# Patient Record
Sex: MALE | Race: WHITE | NOT HISPANIC OR LATINO | Employment: FULL TIME | URBAN - METROPOLITAN AREA
[De-identification: names, ages, dates, MRNs, and addresses within clinical notes are randomized per-mention and may not be internally consistent; named-entity substitution may affect disease eponyms.]

---

## 2018-06-20 ENCOUNTER — TELEPHONE (OUTPATIENT)
Dept: PAIN MEDICINE | Facility: MEDICAL CENTER | Age: 53
End: 2018-06-20

## 2018-06-20 NOTE — TELEPHONE ENCOUNTER
Patient called to schedule  Intake complete and sent to [facility] office  Patient stated he is being referred by his PCP  Will have order and office notes sent  Fax# given

## 2018-06-25 ENCOUNTER — TELEPHONE (OUTPATIENT)
Dept: PAIN MEDICINE | Facility: CLINIC | Age: 53
End: 2018-06-25

## 2018-07-02 NOTE — TELEPHONE ENCOUNTER
Pt returning call, pt is scheduled for 7/5 CON with Dr Santos Bruno  Will come in early to fill out pw

## 2018-07-05 ENCOUNTER — TELEPHONE (OUTPATIENT)
Dept: PAIN MEDICINE | Facility: MEDICAL CENTER | Age: 53
End: 2018-07-05

## 2018-07-05 ENCOUNTER — CONSULT (OUTPATIENT)
Dept: PAIN MEDICINE | Facility: CLINIC | Age: 53
End: 2018-07-05
Payer: COMMERCIAL

## 2018-07-05 VITALS
HEIGHT: 70 IN | TEMPERATURE: 98 F | WEIGHT: 223.4 LBS | DIASTOLIC BLOOD PRESSURE: 80 MMHG | SYSTOLIC BLOOD PRESSURE: 128 MMHG | HEART RATE: 60 BPM | BODY MASS INDEX: 31.98 KG/M2

## 2018-07-05 DIAGNOSIS — M54.12 CERVICAL RADICULOPATHY: ICD-10-CM

## 2018-07-05 DIAGNOSIS — M54.2 NECK PAIN: Primary | ICD-10-CM

## 2018-07-05 PROCEDURE — 99204 OFFICE O/P NEW MOD 45 MIN: CPT | Performed by: ANESTHESIOLOGY

## 2018-07-05 RX ORDER — NORTRIPTYLINE HYDROCHLORIDE 25 MG/1
25 CAPSULE ORAL
Qty: 30 CAPSULE | Refills: 0 | Status: SHIPPED | OUTPATIENT
Start: 2018-07-05

## 2018-07-05 NOTE — TELEPHONE ENCOUNTER
Patient called stating she was seen in the office today and was prescribed a medication that he is not interested in taking   C/b [phone]

## 2018-07-05 NOTE — LETTER
July 5, 2018     Tyler Lane, [address]    Patient: Janeth Rangel   YOB: 1965   Date of Visit: 7/5/2018       Dear Dr Juan Saunders: Thank you for referring Janeth Rangel to me for evaluation  Below are my notes for this consultation  If you have questions, please do not hesitate to call me  I look forward to following your patient along with you  Sincerely,        Piter Quiros MD        CC: Tanmay Whittington MD  7/5/2018  9:17 AM  Sign at close encounter  Assessment:  1  Neck pain    2  Cervical radiculopathy        Plan:  Orders Placed This Encounter   Procedures    MRI cervical spine without contrast     Standing Status:   Future     Standing Expiration Date:   7/5/2022     Scheduling Instructions: There is no preparation for this test  Please leave your jewelry and valuables at home, wedding rings are the exception  Please bring your insurance cards, a form of photo ID and a list of your medications with you  Arrive 15 minutes prior to your appointment time in order to register  Please bring any prior CT or MRI studies of this area that were not performed at a [facility]  To schedule this appointment, please contact Central Scheduling at [phone]  Order Specific Question:   What is the patient's sedation requirement? Answer:   Unknown       New Medications Ordered This Visit   Medications    nortriptyline (PAMELOR) 25 mg capsule     Sig: Take 1 capsule (25 mg total) by mouth daily at bedtime     Dispense:  30 capsule     Refill:  0       My impressions and treatment recommendations were discussed in detail with the patient, who verbalized understanding and had no further questions  The patient is currently complaining of neck pain with left upper extremity radiculopathy  He has undergone 4 weeks of physician directed home exercises through his primary care physician    He also reports that he has an inversion table at home and has trialed for his pain without significant pain relief  He also reports that Lyrica, gabapentin, meloxicam, and Diazepam do not give him any relief of symptoms  As such, I felt a reasonable to have the patient undergo a MRI of the cervical spine to evaluate for any pathology that may be contributing to his pain complaints  I also felt a reasonable to trial the patient on nortriptyline 25 mg once daily at bedtime since he is complaining of a significant amount of pain at night  Side effects were reviewed with the patient  Follow-up is planned in 4 weeks time or sooner as warranted  Discharge instructions were provided  I personally saw and examined the patient and I agree with the above discussed plan of care  History of Present Illness:    Emeka Deras is a 50 y o  male who presents to [facility] for initial evaluation of the above stated pain complaints  The patient has a past medical and chronic pain history as outlined in the assessment section  He was referred by Dr Violeta Cruz  The patient reports a 1+ month history of neck pain with left upper extremity radicular symptoms  He describes his pain as moderate to severe and 5/10 on the verbal numerical pain rating scale  His pain is constant in nature and worse at night  He describes his pain as shooting, numbness, sharp, cutting, pins and needles, and throbbing    The patient reports that walking decreases pain  Lying down, sitting, exercise, and coughing/sneezing increases pain  He reports that home exercises have provided moderate pain relief  He reports that Diazepam, meloxicam, gabapentin, and Lyrica do not provide him significant relief of symptoms  He does state that about 4 weeks worth of physician directed exercises through his primary care physician did not provide him any pain relief    He is currently using Aleve/aspirin as needed for pain, but reports that he is not getting any pain relief from this  He is currently employed full-time in real [employer]  Review of Systems:    Review of Systems   Constitutional: Negative for fever and unexpected weight change  HENT: Negative for trouble swallowing  Eyes: Negative for visual disturbance  Respiratory: Negative for shortness of breath and wheezing  Cardiovascular: Negative for chest pain and palpitations  Gastrointestinal: Negative for constipation, diarrhea, nausea and vomiting  Endocrine: Negative for cold intolerance, heat intolerance and polydipsia  Genitourinary: Negative for difficulty urinating and frequency  Musculoskeletal: Negative for arthralgias, gait problem, joint swelling and myalgias  Skin: Negative for rash  Neurological: Positive for weakness (muscle weakness)  Negative for dizziness, seizures, syncope and headaches  Hematological: Does not bruise/bleed easily  Psychiatric/Behavioral: Negative for dysphoric mood  All other systems reviewed and are negative  Patient Active Problem List   Diagnosis    Neck pain    Cervical radiculopathy       No past medical history on file  No past surgical history on file  No family history on file  Social History     Occupational History    Not on file       Social History Main Topics    Smoking status: Not on file    Smokeless tobacco: Not on file    Alcohol use Not on file    Drug use: Unknown    Sexual activity: Not on file         Current Outpatient Prescriptions:     nortriptyline (PAMELOR) 25 mg capsule, Take 1 capsule (25 mg total) by mouth daily at bedtime, Disp: 30 capsule, Rfl: 0    No Known Allergies    Physical Exam:    /80 (BP Location: Left arm, Patient Position: Sitting, Cuff Size: Standard)   Pulse 60   Temp 98 °F (36 7 °C) (Oral)   Ht 5' 9 5" (1 765 m)   Wt 101 kg (223 lb 6 4 oz)   BMI 32 52 kg/m²      Constitutional: obese  Eyes: anicteric  HEENT: grossly intact  Neck: supple, symmetric, trachea midline and no masses   Pulmonary:even and unlabored  Cardiovascular:No edema or pitting edema present  Skin:Normal without rashes or lesions and well hydrated  Psychiatric:Mood and affect appropriate  Neurologic:Cranial Nerves II-XII grossly intact  Musculoskeletal:normal     Cervical Spine Exam    Appearance:  Normal lordosis  Palpation/Tenderness:  no tenderness or spasm  Sensory:  no sensory deficits noted  Range of Motion:  Flexion:   Moderately limited  with pain  Extension:  Moderately limited  with pain  Lateral Flexion - Left:  No limitation  without pain  Lateral Flexion - Right:  No limitation  without pain  Rotation - Left:  No limitation  without pain  Rotation - Right:  No limitation  without pain   Cervical facet loading is negative bilaterally  Motor Strength:  Left Arm Flexion  5/5  Left Arm Extension  5/5  Right Arm Flexion  5/5  Right Arm Extension  5/5  Left Wrist Flexion  5/5  Left Wrist Extension  5/5  Left Finger Abduction  5/5  Right Finger Abduction  5/5  Left Pincer Grasp  5/5  Right Pincer Grasp  5/5  Left    5/5  Right   5/5  Reflexes:  Left Biceps:  2+   Right Biceps:  2+   Left Brachioradialis:  2+   Right Brachioradialis:  2+   Left Triceps:  2+   Right Triceps:  2+   Special Tests:  Left Spurlings:  negative  Right Spurlings  negative      Imaging  MRI cervical spine without contrast    (Results Pending)       Orders Placed This Encounter   Procedures    MRI cervical spine without contrast

## 2018-07-05 NOTE — PROGRESS NOTES
Assessment:  1  Neck pain    2  Cervical radiculopathy        Plan:  Orders Placed This Encounter   Procedures    MRI cervical spine without contrast     Standing Status:   Future     Standing Expiration Date:   7/5/2022     Scheduling Instructions: There is no preparation for this test  Please leave your jewelry and valuables at home, wedding rings are the exception  Please bring your insurance cards, a form of photo ID and a list of your medications with you  Arrive 15 minutes prior to your appointment time in order to register  Please bring any prior CT or MRI studies of this area that were not performed at a [facility]  To schedule this appointment, please contact Central Scheduling at [phone]  Order Specific Question:   What is the patient's sedation requirement? Answer:   Unknown       New Medications Ordered This Visit   Medications    nortriptyline (PAMELOR) 25 mg capsule     Sig: Take 1 capsule (25 mg total) by mouth daily at bedtime     Dispense:  30 capsule     Refill:  0       My impressions and treatment recommendations were discussed in detail with the patient, who verbalized understanding and had no further questions  The patient is currently complaining of neck pain with left upper extremity radiculopathy  He has undergone 4 weeks of physician directed home exercises through his primary care physician  He also reports that he has an inversion table at home and has trialed for his pain without significant pain relief  He also reports that Lyrica, gabapentin, meloxicam, and Diazepam do not give him any relief of symptoms  As such, I felt a reasonable to have the patient undergo a MRI of the cervical spine to evaluate for any pathology that may be contributing to his pain complaints  I also felt a reasonable to trial the patient on nortriptyline 25 mg once daily at bedtime since he is complaining of a significant amount of pain at night    Side effects were reviewed with the patient  Follow-up is planned in 4 weeks time or sooner as warranted  Discharge instructions were provided  I personally saw and examined the patient and I agree with the above discussed plan of care  History of Present Illness:    Janeth Rangel is a 50 y o  male who presents to [facility] for initial evaluation of the above stated pain complaints  The patient has a past medical and chronic pain history as outlined in the assessment section  He was referred by Dr Tyler Lane  The patient reports a 1+ month history of neck pain with left upper extremity radicular symptoms  He describes his pain as moderate to severe and 5/10 on the verbal numerical pain rating scale  His pain is constant in nature and worse at night  He describes his pain as shooting, numbness, sharp, cutting, pins and needles, and throbbing    The patient reports that walking decreases pain  Lying down, sitting, exercise, and coughing/sneezing increases pain  He reports that home exercises have provided moderate pain relief  He reports that Diazepam, meloxicam, gabapentin, and Lyrica do not provide him significant relief of symptoms  He does state that about 4 weeks worth of physician directed exercises through his primary care physician did not provide him any pain relief  He is currently using Aleve/aspirin as needed for pain, but reports that he is not getting any pain relief from this  He is currently employed full-time in real estate for [employer]  Review of Systems:    Review of Systems   Constitutional: Negative for fever and unexpected weight change  HENT: Negative for trouble swallowing  Eyes: Negative for visual disturbance  Respiratory: Negative for shortness of breath and wheezing  Cardiovascular: Negative for chest pain and palpitations  Gastrointestinal: Negative for constipation, diarrhea, nausea and vomiting     Endocrine: Negative for cold intolerance, heat intolerance and polydipsia  Genitourinary: Negative for difficulty urinating and frequency  Musculoskeletal: Negative for arthralgias, gait problem, joint swelling and myalgias  Skin: Negative for rash  Neurological: Positive for weakness (muscle weakness)  Negative for dizziness, seizures, syncope and headaches  Hematological: Does not bruise/bleed easily  Psychiatric/Behavioral: Negative for dysphoric mood  All other systems reviewed and are negative  Patient Active Problem List   Diagnosis    Neck pain    Cervical radiculopathy       No past medical history on file  No past surgical history on file  No family history on file  Social History     Occupational History    Not on file  Social History Main Topics    Smoking status: Not on file    Smokeless tobacco: Not on file    Alcohol use Not on file    Drug use: Unknown    Sexual activity: Not on file         Current Outpatient Prescriptions:     nortriptyline (PAMELOR) 25 mg capsule, Take 1 capsule (25 mg total) by mouth daily at bedtime, Disp: 30 capsule, Rfl: 0    No Known Allergies    Physical Exam:    /80 (BP Location: Left arm, Patient Position: Sitting, Cuff Size: Standard)   Pulse 60   Temp 98 °F (36 7 °C) (Oral)   Ht 5' 9 5" (1 765 m)   Wt 101 kg (223 lb 6 4 oz)   BMI 32 52 kg/m²     Constitutional: obese  Eyes: anicteric  HEENT: grossly intact  Neck: supple, symmetric, trachea midline and no masses   Pulmonary:even and unlabored  Cardiovascular:No edema or pitting edema present  Skin:Normal without rashes or lesions and well hydrated  Psychiatric:Mood and affect appropriate  Neurologic:Cranial Nerves II-XII grossly intact  Musculoskeletal:normal     Cervical Spine Exam    Appearance:  Normal lordosis  Palpation/Tenderness:  no tenderness or spasm  Sensory:  no sensory deficits noted  Range of Motion:  Flexion:   Moderately limited  with pain  Extension:  Moderately limited  with pain  Lateral Flexion - Left:  No limitation  without pain  Lateral Flexion - Right:  No limitation  without pain  Rotation - Left:  No limitation  without pain  Rotation - Right:  No limitation  without pain   Cervical facet loading is negative bilaterally  Motor Strength:  Left Arm Flexion  5/5  Left Arm Extension  5/5  Right Arm Flexion  5/5  Right Arm Extension  5/5  Left Wrist Flexion  5/5  Left Wrist Extension  5/5  Left Finger Abduction  5/5  Right Finger Abduction  5/5  Left Pincer Grasp  5/5  Right Pincer Grasp  5/5  Left    5/5  Right   5/5  Reflexes:  Left Biceps:  2+   Right Biceps:  2+   Left Brachioradialis:  2+   Right Brachioradialis:  2+   Left Triceps:  2+   Right Triceps:  2+   Special Tests:  Left Spurlings:  negative  Right Spurlings  negative      Imaging  MRI cervical spine without contrast    (Results Pending)       Orders Placed This Encounter   Procedures    MRI cervical spine without contrast

## 2018-07-05 NOTE — TELEPHONE ENCOUNTER
Attempted to call the patient and left a detailed mom in regards to his previous task  Totally his preogative if he does not want to take the pamelor  We will contact him once we have his MRI results  Pt  To CB if any questions